# Patient Record
Sex: FEMALE | Race: WHITE | NOT HISPANIC OR LATINO
[De-identification: names, ages, dates, MRNs, and addresses within clinical notes are randomized per-mention and may not be internally consistent; named-entity substitution may affect disease eponyms.]

---

## 2018-01-17 ENCOUNTER — APPOINTMENT (OUTPATIENT)
Dept: SURGERY | Facility: CLINIC | Age: 73
End: 2018-01-17
Payer: MEDICARE

## 2018-01-17 PROCEDURE — 99213 OFFICE O/P EST LOW 20 MIN: CPT

## 2019-01-23 ENCOUNTER — APPOINTMENT (OUTPATIENT)
Dept: SURGERY | Facility: CLINIC | Age: 74
End: 2019-01-23
Payer: MEDICARE

## 2019-01-23 ENCOUNTER — LABORATORY RESULT (OUTPATIENT)
Age: 74
End: 2019-01-23

## 2019-01-23 PROCEDURE — 76942 ECHO GUIDE FOR BIOPSY: CPT | Mod: 59

## 2019-01-23 PROCEDURE — 10005 FNA BX W/US GDN 1ST LES: CPT

## 2019-01-23 PROCEDURE — 99213 OFFICE O/P EST LOW 20 MIN: CPT

## 2019-01-23 NOTE — PHYSICAL EXAM
[de-identified] : No cervical or supraclavicular adenopathy, trachea midline, thyroid mildly enlarged without firm or irregular nodule [Normal] : orientation to person, place, and time: normal

## 2019-01-23 NOTE — HISTORY OF PRESENT ILLNESS
[de-identified] : Patient with history of multinodular goiter. Prior biopsy 2001 benign. Last ultrasound June 2015 without significant change. Patient reports occasional dysphagia with worsening reflux, denies change in voice or radiation exposures.\par patient went for US FNA no significant nodules noted.  f/u US 12/17 stable MNG.  patient reports worsening dysphagia, recently diagnosed with GERD on medication, improving.  Repeat US 12/10/18 with increase in right lower nodule.   unknown

## 2019-01-23 NOTE — ASSESSMENT
[FreeTextEntry1] : no suspicious findings on PE, US guided FNA of enlarging right nodule performed. . f/u US 6/2019 if stable RTO 1 year. multiple questions answered

## 2019-07-24 ENCOUNTER — APPOINTMENT (OUTPATIENT)
Dept: SURGERY | Facility: CLINIC | Age: 74
End: 2019-07-24

## 2020-06-19 ENCOUNTER — APPOINTMENT (OUTPATIENT)
Dept: SURGERY | Facility: CLINIC | Age: 75
End: 2020-06-19

## 2021-06-22 ENCOUNTER — APPOINTMENT (OUTPATIENT)
Dept: SURGERY | Facility: CLINIC | Age: 76
End: 2021-06-22
Payer: MEDICARE

## 2021-06-22 VITALS
OXYGEN SATURATION: 97 % | SYSTOLIC BLOOD PRESSURE: 118 MMHG | HEIGHT: 64 IN | DIASTOLIC BLOOD PRESSURE: 72 MMHG | BODY MASS INDEX: 32.78 KG/M2 | WEIGHT: 192 LBS | TEMPERATURE: 97 F | HEART RATE: 76 BPM

## 2021-06-22 PROCEDURE — 99213 OFFICE O/P EST LOW 20 MIN: CPT

## 2021-06-22 NOTE — PHYSICAL EXAM
[Normal] : orientation to person, place, and time: normal [de-identified] : No cervical or supraclavicular adenopathy, trachea midline, thyroid mildly enlarged without firm or irregular nodule

## 2021-06-22 NOTE — ASSESSMENT
[FreeTextEntry1] : no suspicious findings on PE, US guided FNA of enlarging right nodule was benign . f/u US 5/2022  RTO 1 year. multiple questions answered

## 2021-06-22 NOTE — HISTORY OF PRESENT ILLNESS
[de-identified] : Patient with history of multinodular goiter. Prior biopsy 2001 benign. Last ultrasound June 2015 without significant change. Patient reports occasional dysphagia with worsening reflux, denies change in voice or radiation exposures.\par patient went for US FNA no significant nodules noted.  f/u US 12/17 stable MNG.  patient reports worsening dysphagia, recently diagnosed with GERD on medication, improving.  Repeat US 12/10/18 with increase in right lower nodule.  biopsy right lower nodule 1/2019 benign. f/u US 2/2020 stable MNG.  repeat US 6/2021 no appreciable change. recent vnei with normal TFTs, per patient.  report not available.  I have reviewed all old and new data and available images.

## 2022-07-12 ENCOUNTER — APPOINTMENT (OUTPATIENT)
Dept: SURGERY | Facility: CLINIC | Age: 77
End: 2022-07-12

## 2022-07-12 VITALS
OXYGEN SATURATION: 98 % | DIASTOLIC BLOOD PRESSURE: 70 MMHG | WEIGHT: 193 LBS | SYSTOLIC BLOOD PRESSURE: 116 MMHG | HEIGHT: 63 IN | TEMPERATURE: 97.1 F | HEART RATE: 74 BPM | BODY MASS INDEX: 34.2 KG/M2

## 2022-07-12 PROCEDURE — 99213 OFFICE O/P EST LOW 20 MIN: CPT

## 2022-07-12 NOTE — PHYSICAL EXAM
[de-identified] : No cervical or supraclavicular adenopathy, trachea midline, thyroid mildly enlarged without firm or irregular nodule [Normal] : orientation to person, place, and time: normal

## 2022-07-12 NOTE — HISTORY OF PRESENT ILLNESS
[de-identified] : Patient with history of multinodular goiter. Prior biopsy 2001 benign. Last ultrasound June 2015 without significant change. Patient reports occasional dysphagia with worsening reflux, denies change in voice or radiation exposures.\par patient went for US FNA no significant nodules noted.  f/u US 12/17 stable MNG.  patient reports worsening dysphagia, recently diagnosed with GERD on medication, improving.  Repeat US 12/10/18 with increase in right lower nodule.  biopsy right lower nodule 1/2019 benign. f/u US 2/2020 stable MNG.  repeat US 6/2021 no appreciable change. recent vnei with normal TFTs, per patient.  report not available. recent thyroid US with minmal change in MNG.    I have reviewed all old and new data and available images.

## 2022-07-12 NOTE — ASSESSMENT
[FreeTextEntry1] : no suspicious findings on PE, US guided FNA of enlarging right nodule was benign , no appreciable change on US.  . f/u US 6/2023  RTO 1 year. multiple questions answered

## 2023-06-13 ENCOUNTER — APPOINTMENT (OUTPATIENT)
Dept: SURGERY | Facility: CLINIC | Age: 78
End: 2023-06-13
Payer: MEDICARE

## 2023-06-13 VITALS
SYSTOLIC BLOOD PRESSURE: 122 MMHG | TEMPERATURE: 96.9 F | HEIGHT: 63 IN | DIASTOLIC BLOOD PRESSURE: 70 MMHG | WEIGHT: 188 LBS | HEART RATE: 79 BPM | OXYGEN SATURATION: 98 % | BODY MASS INDEX: 33.31 KG/M2

## 2023-06-13 DIAGNOSIS — E04.9 NONTOXIC GOITER, UNSPECIFIED: ICD-10-CM

## 2023-06-13 DIAGNOSIS — E04.2 NONTOXIC MULTINODULAR GOITER: ICD-10-CM

## 2023-06-13 PROCEDURE — 99213 OFFICE O/P EST LOW 20 MIN: CPT

## 2023-06-13 NOTE — HISTORY OF PRESENT ILLNESS
[de-identified] : Patient with history of multinodular goiter. Prior biopsy 2001 benign. Last ultrasound June 2015 without significant change. Patient reports occasional dysphagia with worsening reflux, denies change in voice or radiation exposures.\par patient went for US FNA no significant nodules noted.  f/u US 12/17 stable MNG.  patient reports worsening dysphagia, recently diagnosed with GERD on medication, improving.  Repeat US 12/10/18 with increase in right lower nodule.  biopsy right lower nodule 1/2019 benign. f/u US 2/2020 stable MNG.  repeat US 6/2021 no appreciable change. recent vnei with normal TFTs, per patient.  June 2022 thyroid US with minmal change in MNG.  Patient had follow-up ultrasound last month, report not available.  Most recent blood work with Dr. Kay, thyroid functions were normal.   I have reviewed all old and new data and available images.

## 2023-06-13 NOTE — ASSESSMENT
[FreeTextEntry1] : no suspicious findings on PE, US guided FNA of enlarging right nodule was benign , no appreciable change on US for physical exam.  Patient will continue under the care of Dr. Kay as noted.  I have answered her questions to the best of my ability..

## 2023-06-13 NOTE — PHYSICAL EXAM
[de-identified] : No cervical or supraclavicular adenopathy, trachea midline, thyroid mildly enlarged without firm or irregular nodule [Normal] : orientation to person, place, and time: normal

## 2023-07-11 ENCOUNTER — NON-APPOINTMENT (OUTPATIENT)
Age: 78
End: 2023-07-11

## 2024-07-02 ENCOUNTER — APPOINTMENT (OUTPATIENT)
Dept: ORTHOPEDIC SURGERY | Facility: CLINIC | Age: 79
End: 2024-07-02
Payer: MEDICARE

## 2024-07-02 ENCOUNTER — RESULT CHARGE (OUTPATIENT)
Age: 79
End: 2024-07-02

## 2024-07-02 VITALS — BODY MASS INDEX: 31.89 KG/M2 | HEIGHT: 63 IN | WEIGHT: 180 LBS

## 2024-07-02 DIAGNOSIS — M17.11 UNILATERAL PRIMARY OSTEOARTHRITIS, RIGHT KNEE: ICD-10-CM

## 2024-07-02 PROCEDURE — 20610 DRAIN/INJ JOINT/BURSA W/O US: CPT | Mod: RT

## 2024-07-02 PROCEDURE — 99204 OFFICE O/P NEW MOD 45 MIN: CPT | Mod: 25

## 2024-07-02 PROCEDURE — 73564 X-RAY EXAM KNEE 4 OR MORE: CPT | Mod: RT

## 2024-07-02 PROCEDURE — J3490M: CUSTOM | Mod: NC

## 2024-07-30 ENCOUNTER — APPOINTMENT (OUTPATIENT)
Dept: ORTHOPEDIC SURGERY | Facility: CLINIC | Age: 79
End: 2024-07-30

## 2024-08-06 ENCOUNTER — APPOINTMENT (OUTPATIENT)
Dept: ORTHOPEDIC SURGERY | Facility: CLINIC | Age: 79
End: 2024-08-06

## 2024-08-13 ENCOUNTER — APPOINTMENT (OUTPATIENT)
Dept: ORTHOPEDIC SURGERY | Facility: CLINIC | Age: 79
End: 2024-08-13

## 2025-06-18 ENCOUNTER — OFFICE (OUTPATIENT)
Facility: LOCATION | Age: 80
Setting detail: OPHTHALMOLOGY
End: 2025-06-18
Payer: MEDICARE

## 2025-06-18 VITALS — BODY MASS INDEX: 31.71 KG/M2 | WEIGHT: 179 LBS | HEIGHT: 63 IN

## 2025-06-18 DIAGNOSIS — H25.041: ICD-10-CM

## 2025-06-18 DIAGNOSIS — H50.32: ICD-10-CM

## 2025-06-18 DIAGNOSIS — H25.012: ICD-10-CM

## 2025-06-18 PROCEDURE — 92136 OPHTHALMIC BIOMETRY: CPT | Mod: TC | Performed by: OPHTHALMOLOGY

## 2025-06-18 PROCEDURE — 99204 OFFICE O/P NEW MOD 45 MIN: CPT | Performed by: OPHTHALMOLOGY

## 2025-06-18 PROCEDURE — 92136 OPHTHALMIC BIOMETRY: CPT | Mod: 26,RT | Performed by: OPHTHALMOLOGY

## 2025-06-18 ASSESSMENT — KERATOMETRY
OS_CYLPOWER_DEGREES: 0.75
OS_K1K2_AVERAGE: 43.125
OS_AXISANGLE_DEGREES: 23
OS_K1POWER_DIOPTERS: 42.75
OS_CYLAXISANGLE_DEGREES: 113
OS_K2POWER_DIOPTERS: 43.50
OS_AXISANGLE2_DEGREES: 113
OS_K2POWER_DIOPTERS: 43.50
OS_K1POWER_DIOPTERS: 42.75
OS_AXISANGLE_DEGREES: 113

## 2025-06-18 ASSESSMENT — REFRACTION_AUTOREFRACTION
OD_AXIS: 114
OS_SPHERE: +8.00
OD_CYLINDER: -0.75
OD_SPHERE: +5.50
OS_CYLINDER: -0.75
OS_AXIS: 027

## 2025-06-18 ASSESSMENT — VISUAL ACUITY
OD_BCVA: 20/50*
OS_BCVA: 20/200

## 2025-07-15 ENCOUNTER — ASC (OUTPATIENT)
Dept: URBAN - METROPOLITAN AREA SURGERY 8 | Facility: SURGERY | Age: 80
Setting detail: OPHTHALMOLOGY
End: 2025-07-15
Payer: MEDICARE

## 2025-07-15 DIAGNOSIS — H25.11: ICD-10-CM

## 2025-07-15 DIAGNOSIS — H52.221: ICD-10-CM

## 2025-07-15 PROCEDURE — 68841 INSJ RX ELUT IMPLT LAC CANAL: CPT | Mod: RT | Performed by: OPHTHALMOLOGY

## 2025-07-15 PROCEDURE — FEMTO PRECISION LASER CATARACT SURGERY: Mod: GY | Performed by: OPHTHALMOLOGY

## 2025-07-15 PROCEDURE — 66984 XCAPSL CTRC RMVL W/O ECP: CPT | Mod: 54,RT | Performed by: OPHTHALMOLOGY

## 2025-07-15 PROCEDURE — S9986 NOT MEDICALLY NECESSARY SVC: HCPCS | Mod: GX,GY | Performed by: OPHTHALMOLOGY

## 2025-07-16 ENCOUNTER — OFFICE (OUTPATIENT)
Facility: LOCATION | Age: 80
Setting detail: OPHTHALMOLOGY
End: 2025-07-16
Payer: MEDICARE

## 2025-07-16 ENCOUNTER — RX ONLY (RX ONLY)
Age: 80
End: 2025-07-16

## 2025-07-16 DIAGNOSIS — H25.012: ICD-10-CM

## 2025-07-16 DIAGNOSIS — Z96.1: ICD-10-CM

## 2025-07-16 PROCEDURE — 99024 POSTOP FOLLOW-UP VISIT: CPT | Performed by: OPHTHALMOLOGY

## 2025-07-16 PROCEDURE — 92136 OPHTHALMIC BIOMETRY: CPT | Mod: 26,LT | Performed by: OPHTHALMOLOGY

## 2025-07-16 ASSESSMENT — CONFRONTATIONAL VISUAL FIELD TEST (CVF)
OD_FINDINGS: FULL
OS_FINDINGS: FULL

## 2025-07-16 ASSESSMENT — KERATOMETRY
OD_K1POWER_DIOPTERS: 43.00
OS_K2POWER_DIOPTERS: 43.75
OS_K1POWER_DIOPTERS: 43.00
OD_K2POWER_DIOPTERS: 43.50
OS_AXISANGLE_DEGREES: 130
OD_AXISANGLE_DEGREES: 092

## 2025-07-16 ASSESSMENT — REFRACTION_AUTOREFRACTION
OD_CYLINDER: -0.75
OS_SPHERE: +8.00
OD_AXIS: 158
OS_AXIS: 025
OD_SPHERE: +0.75
OS_CYLINDER: -0.75

## 2025-07-16 ASSESSMENT — VISUAL ACUITY: OS_BCVA: 20/30-1

## 2025-07-25 ENCOUNTER — OFFICE (OUTPATIENT)
Facility: LOCATION | Age: 80
Setting detail: OPHTHALMOLOGY
End: 2025-07-25
Payer: MEDICARE

## 2025-07-25 DIAGNOSIS — H25.012: ICD-10-CM

## 2025-07-25 DIAGNOSIS — Z96.1: ICD-10-CM

## 2025-07-25 PROCEDURE — 92136 OPHTHALMIC BIOMETRY: CPT | Mod: 26,LT | Performed by: OPHTHALMOLOGY

## 2025-07-25 PROCEDURE — 99024 POSTOP FOLLOW-UP VISIT: CPT | Performed by: OPHTHALMOLOGY

## 2025-07-25 ASSESSMENT — KERATOMETRY
OD_K1POWER_DIOPTERS: 43.00
OD_K2POWER_DIOPTERS: 44.00
OS_K1POWER_DIOPTERS: 43.00
OS_AXISANGLE_DEGREES: 118
OD_AXISANGLE_DEGREES: 075
OS_K2POWER_DIOPTERS: 43.75

## 2025-07-25 ASSESSMENT — CONFRONTATIONAL VISUAL FIELD TEST (CVF)
OD_FINDINGS: FULL
OS_FINDINGS: FULL

## 2025-07-25 ASSESSMENT — VISUAL ACUITY
OD_BCVA: 20/400
OS_BCVA: 20/25-2

## 2025-07-25 ASSESSMENT — REFRACTION_AUTOREFRACTION
OS_CYLINDER: -0.75
OS_SPHERE: +8.25
OS_AXIS: 028
OD_AXIS: 140
OD_SPHERE: +0.50
OD_CYLINDER: -1.25

## 2025-08-05 ENCOUNTER — ASC (OUTPATIENT)
Dept: URBAN - METROPOLITAN AREA SURGERY 8 | Facility: SURGERY | Age: 80
Setting detail: OPHTHALMOLOGY
End: 2025-08-05
Payer: MEDICARE

## 2025-08-05 DIAGNOSIS — H25.12: ICD-10-CM

## 2025-08-05 DIAGNOSIS — H52.222: ICD-10-CM

## 2025-08-05 PROCEDURE — S9986 NOT MEDICALLY NECESSARY SVC: HCPCS | Mod: GX,GY | Performed by: OPHTHALMOLOGY

## 2025-08-05 PROCEDURE — 68841 INSJ RX ELUT IMPLT LAC CANAL: CPT | Mod: 79,LT | Performed by: OPHTHALMOLOGY

## 2025-08-05 PROCEDURE — FEMTO PRECISION LASER CATARACT SURGERY: Mod: GY,LT | Performed by: OPHTHALMOLOGY

## 2025-08-05 PROCEDURE — 66984 XCAPSL CTRC RMVL W/O ECP: CPT | Mod: 79,54,LT | Performed by: OPHTHALMOLOGY

## 2025-08-06 ENCOUNTER — OFFICE (OUTPATIENT)
Facility: LOCATION | Age: 80
Setting detail: OPHTHALMOLOGY
End: 2025-08-06
Payer: MEDICARE

## 2025-08-06 DIAGNOSIS — Z96.1: ICD-10-CM

## 2025-08-06 PROCEDURE — 99024 POSTOP FOLLOW-UP VISIT: CPT | Performed by: OPHTHALMOLOGY

## 2025-08-06 ASSESSMENT — KERATOMETRY
OS_K1POWER_DIOPTERS: 43.00
OD_K2POWER_DIOPTERS: 43.75
OS_K2POWER_DIOPTERS: 43.75
OD_AXISANGLE_DEGREES: 058
OS_AXISANGLE_DEGREES: 101
OD_K1POWER_DIOPTERS: 43.00

## 2025-08-06 ASSESSMENT — TONOMETRY: OD_IOP_MMHG: 20

## 2025-08-06 ASSESSMENT — REFRACTION_AUTOREFRACTION
OD_AXIS: 127
OS_SPHERE: 0.00
OS_CYLINDER: -0.50
OD_CYLINDER: -1.00
OS_AXIS: 047
OD_SPHERE: +0.75

## 2025-08-06 ASSESSMENT — VISUAL ACUITY
OS_BCVA: 20/30-2
OD_BCVA: 20/40+1

## 2025-08-06 ASSESSMENT — CONFRONTATIONAL VISUAL FIELD TEST (CVF)
OS_FINDINGS: FULL
OD_FINDINGS: FULL

## 2025-09-09 ENCOUNTER — APPOINTMENT (OUTPATIENT)
Dept: ORTHOPEDIC SURGERY | Facility: CLINIC | Age: 80
End: 2025-09-09
Payer: MEDICARE

## 2025-09-09 ENCOUNTER — TRANSCRIPTION ENCOUNTER (OUTPATIENT)
Age: 80
End: 2025-09-09

## 2025-09-09 VITALS — BODY MASS INDEX: 31.89 KG/M2 | HEIGHT: 63 IN | WEIGHT: 180 LBS

## 2025-09-09 DIAGNOSIS — M17.11 UNILATERAL PRIMARY OSTEOARTHRITIS, RIGHT KNEE: ICD-10-CM

## 2025-09-09 PROCEDURE — 99214 OFFICE O/P EST MOD 30 MIN: CPT

## 2025-09-09 PROCEDURE — G2211 COMPLEX E/M VISIT ADD ON: CPT

## 2025-09-09 PROCEDURE — 73564 X-RAY EXAM KNEE 4 OR MORE: CPT | Mod: RT
